# Patient Record
(demographics unavailable — no encounter records)

---

## 2025-03-14 NOTE — DISCUSSION/SUMMARY
[FreeTextEntry1] : 24-year-old G0, P0 presents for GYN evaluation.  She is sexually active and uses condoms.  History of abnormal Pap smear treated with LEEP procedure.  Physical exam is normal.  Pap GC chlamydia sent.  Patient to return in 1 year for follow-up.  All questions answered.

## 2025-03-14 NOTE — HISTORY OF PRESENT ILLNESS
[Condoms] : uses condoms [Y] : Patient is sexually active [N] : Patient denies prior pregnancies [Regular Cycle Intervals] : periods have been regular [Frequency: Q ___ days] : menstrual periods occur approximately every [unfilled] days [Menarche Age: ____] : age at menarche was [unfilled] [FreeTextEntry1] : 24-year-old  last menstrual cycle was 3/14 /25 presents for GYN evaluation.  History of abnormal Pap smear treated with LEEP procedure.  She is sexually active and uses condoms.  Denies pain bleeding or discharge. [PGHxTotal] : 0 [PGHxFullTerm] : 0 [PGHxPremature] : 0 [PGHxAbortions] : 0 [[facility] Healthcare[facility]Living] : 0 [PGHxABInduced] : 0 [PGHxABSpont] : 0 [PGHxEctopic] : 0 [PGHxMultBirths] : 0

## 2025-03-14 NOTE — PHYSICAL EXAM
[Chaperone Present] : A chaperone was present in the examining room during all aspects of the physical examination [FreeTextEntry2] : melissa [Appropriately responsive] : appropriately responsive [Alert] : alert [No Acute Distress] : no acute distress [No Lymphadenopathy] : no lymphadenopathy [Regular Rate Rhythm] : regular rate rhythm [No Murmurs] : no murmurs [Clear to Auscultation B/L] : clear to auscultation bilaterally [Soft] : soft [Non-tender] : non-tender [Non-distended] : non-distended [No HSM] : No HSM [No Lesions] : no lesions [No Mass] : no mass [Oriented x3] : oriented x3 [Examination Of The Breasts] : a normal appearance [No Masses] : no breast masses were palpable [Labia Majora] : normal [Labia Minora] : normal [Normal] : normal [Uterine Adnexae] : normal [Declined] : Patient declined rectal exam